# Patient Record
Sex: FEMALE | URBAN - METROPOLITAN AREA
[De-identification: names, ages, dates, MRNs, and addresses within clinical notes are randomized per-mention and may not be internally consistent; named-entity substitution may affect disease eponyms.]

---

## 2021-10-29 ENCOUNTER — OFFICE VISIT (OUTPATIENT)
Dept: URBAN - METROPOLITAN AREA CLINIC 48 | Facility: CLINIC | Age: 41
End: 2021-10-29
Payer: COMMERCIAL

## 2021-10-29 DIAGNOSIS — H25.813 COMBINED FORMS OF AGE-RELATED CATARACT, BILATERAL: ICD-10-CM

## 2021-10-29 DIAGNOSIS — E11.9 TYPE 2 DIABETES MELLITUS W/O COMPLICATION: Primary | ICD-10-CM

## 2021-10-29 PROCEDURE — 92004 COMPRE OPH EXAM NEW PT 1/>: CPT | Performed by: OPHTHALMOLOGY

## 2021-10-29 ASSESSMENT — INTRAOCULAR PRESSURE
OS: 13
OD: 12

## 2021-10-29 NOTE — IMPRESSION/PLAN
Impression: Type 2 diabetes mellitus w/o complication: X49.5. Plan: No diabetic retinopathy on today's exam. Advise maintaining good BS level as indicated per pcp. 

Recommend yearly diabetic exam.

## 2021-10-29 NOTE — IMPRESSION/PLAN
Impression: Combined forms of age-related cataract, bilateral: H25.813. OD>OS Plan: NVS, will monitor along with annual exam. CAT eval. if any vision changes reported next ov. or prior.